# Patient Record
Sex: FEMALE | Race: WHITE | NOT HISPANIC OR LATINO
[De-identification: names, ages, dates, MRNs, and addresses within clinical notes are randomized per-mention and may not be internally consistent; named-entity substitution may affect disease eponyms.]

---

## 2021-10-15 ENCOUNTER — TRANSCRIPTION ENCOUNTER (OUTPATIENT)
Age: 66
End: 2021-10-15

## 2022-11-10 PROBLEM — Z00.00 ENCOUNTER FOR PREVENTIVE HEALTH EXAMINATION: Status: ACTIVE | Noted: 2022-11-10

## 2022-12-13 ENCOUNTER — APPOINTMENT (OUTPATIENT)
Dept: PEDIATRIC ALLERGY IMMUNOLOGY | Facility: CLINIC | Age: 67
End: 2022-12-13

## 2022-12-13 DIAGNOSIS — Z91.013 ALLERGY TO SEAFOOD: ICD-10-CM

## 2022-12-13 DIAGNOSIS — R04.0 EPISTAXIS: ICD-10-CM

## 2022-12-13 DIAGNOSIS — T50.905A ADVERSE EFFECT OF UNSPECIFIED DRUGS, MEDICAMENTS AND BIOLOGICAL SUBSTANCES, INITIAL ENCOUNTER: ICD-10-CM

## 2022-12-13 DIAGNOSIS — L23.0 ALLERGIC CONTACT DERMATITIS DUE TO METALS: ICD-10-CM

## 2022-12-13 DIAGNOSIS — R23.2 FLUSHING: ICD-10-CM

## 2022-12-13 PROCEDURE — 99204 OFFICE O/P NEW MOD 45 MIN: CPT | Mod: 95

## 2022-12-13 RX ORDER — PANTOPRAZOLE 40 MG/1
40 TABLET, DELAYED RELEASE ORAL
Qty: 14 | Refills: 0 | Status: COMPLETED | COMMUNITY
Start: 2022-09-28

## 2022-12-13 RX ORDER — BLOOD SUGAR DIAGNOSTIC
STRIP MISCELLANEOUS
Qty: 50 | Refills: 0 | Status: COMPLETED | COMMUNITY
Start: 2022-09-28

## 2022-12-13 RX ORDER — ENALAPRIL MALEATE 5 MG/1
5 TABLET ORAL
Qty: 30 | Refills: 0 | Status: COMPLETED | COMMUNITY
Start: 2022-09-28

## 2022-12-13 RX ORDER — LOSARTAN POTASSIUM 50 MG/1
50 TABLET, FILM COATED ORAL
Qty: 180 | Refills: 0 | Status: ACTIVE | COMMUNITY
Start: 2022-11-22

## 2022-12-13 RX ORDER — MECLIZINE HYDROCHLORIDE 12.5 MG/1
12.5 TABLET ORAL
Qty: 50 | Refills: 0 | Status: COMPLETED | COMMUNITY
Start: 2022-07-06

## 2022-12-13 RX ORDER — LOSARTAN POTASSIUM 25 MG/1
25 TABLET, FILM COATED ORAL
Qty: 270 | Refills: 0 | Status: COMPLETED | COMMUNITY
Start: 2022-09-07

## 2022-12-13 RX ORDER — ASPIRIN 81 MG/1
81 TABLET, COATED ORAL
Qty: 90 | Refills: 0 | Status: ACTIVE | COMMUNITY
Start: 2022-12-05

## 2022-12-13 RX ORDER — METFORMIN HYDROCHLORIDE 500 MG/1
500 TABLET, COATED ORAL
Qty: 60 | Refills: 0 | Status: COMPLETED | COMMUNITY
Start: 2022-09-28

## 2022-12-13 RX ORDER — EPINEPHRINE 0.3 MG/.3ML
0.3 INJECTION INTRAMUSCULAR
Refills: 0 | Status: ACTIVE | COMMUNITY

## 2022-12-13 RX ORDER — METOPROLOL SUCCINATE 100 MG/1
100 TABLET, EXTENDED RELEASE ORAL
Qty: 90 | Refills: 0 | Status: COMPLETED | COMMUNITY
Start: 2022-10-24

## 2022-12-13 RX ORDER — PRASUGREL 10 MG/1
10 TABLET, FILM COATED ORAL
Qty: 30 | Refills: 0 | Status: COMPLETED | COMMUNITY
Start: 2022-09-28

## 2022-12-13 RX ORDER — METOPROLOL SUCCINATE 50 MG/1
50 TABLET, EXTENDED RELEASE ORAL
Qty: 90 | Refills: 0 | Status: COMPLETED | COMMUNITY
Start: 2022-11-22

## 2022-12-13 RX ORDER — CARVEDILOL 3.12 MG/1
3.12 TABLET, FILM COATED ORAL
Qty: 60 | Refills: 0 | Status: ACTIVE | COMMUNITY
Start: 2022-12-06

## 2022-12-13 RX ORDER — ISOPROPYL ALCOHOL 70 ML/100ML
70 SWAB TOPICAL
Qty: 100 | Refills: 0 | Status: COMPLETED | COMMUNITY
Start: 2022-09-28

## 2022-12-17 NOTE — REVIEW OF SYSTEMS
[Fever] : no fever [Urticaria] : no urticaria [Swelling] : no swelling [Nl] : Respiratory [FreeTextEntry5] : see HPI

## 2022-12-17 NOTE — PHYSICAL EXAM
[Alert] : alert [No Acute Distress] : no acute distress [Normal Voice/Communication] : normal voice communication [Sclera Not Icteric] : sclera not icteric [Normal Rate and Effort] : normal respiratory rhythm and effort [Alert, Awake, Oriented as Age-Appropriate] : alert, awake, oriented as age appropriate

## 2022-12-17 NOTE — CONSULT LETTER
[Dear  ___] : Dear  [unfilled], [Consult Letter:] : I had the pleasure of evaluating your patient, [unfilled]. [Please see my note below.] : Please see my note below. [Consult Closing:] : Thank you very much for allowing me to participate in the care of this patient.  If you have any questions, please do not hesitate to contact me. [Sincerely,] : Sincerely, [DrOzzie  ___] : Dr. IRAHETA [DrOzzie ___] : Dr. IRAHETA [FreeTextEntry3] : Mdadie Barrera MD FAAPAUL, BENJAMIN\par Adult and Pediatric Allergy, Asthma and Clinical Immunology\par  of Medicine and Pediatrics at\par   [facility]\par Section Head, Adult Allergy and Immunology\par   [facility]\par   Division of Allergy, Asthma and Immunology\par   [address] [place] [address], [place] [address]\par   [place] [address]\par   Phone [phone]  Fax [phone]\par \par

## 2022-12-17 NOTE — HISTORY OF PRESENT ILLNESS
[Home] : at home, [unfilled] , at the time of the visit. [Medical Office: ([facility])___] : at the medical office located in  [Verbal consent obtained from patient] : the patient, [unfilled] [Asthma] : asthma [Venom Reactions] : venom reactions [de-identified] : \par AAMIR DUDLEY  is a 67 year old  female with CAD, s/p STEMI 22, DM [facility] address her medication allergies .\par \par She had MI 2022 the day of her mother's . She has been getting better.\par She had 2 angiograms: - 4 stents were put in; 2nd one- 10/11/22 because of abnormal EKG that showed patent RCA stents.. She was premedicated with steroids and Diphenhydramine/Benadryl before both. \par \par She has been having burning tongue (persistent), red flushed hot face (not itchy, on and off all day, feels like a different type of hot flush than menopause), burning in the vaginal area, pain in her armpit and scapula and leg cramps. \par \par She was discharged from the hospital on: Enalapril, Brelinta, Metoprolol, ASA\par Because of burning and blood nose her medications were switched to:\par - Carvedilol (1 week ago) and Losartan (she has been taking for years, took a little break during hospitalization, when she was taking enalapril).\par \par Before MI she has been taking Losartan for HTN for years.\par \par - She was taking baby ASA and Plavix, but stopped it about a week ago because of bloody nose. \par \par About 10 years ago Dr Yadira Jules tested her and told her that she is allergic to:\par Xanthan gum - throat closing, no rash diagnosed by Dr Jules via skin testing, had to go to ER from allergist's office \par Guar gum - throat closing\par IV contrast - developed convulsions as a child. Received contrast with premeds since. She tolerates Gadolinium. \par Sulfa- almost immediately welts and skin on her fingers peel as a young adult\par \par She carries dye-free Diphenhydramine/Benadryl, never had to use  Epinephrine Autoinjector \par She is OK with cellulose gum\par \par She denies history of random hives. Her sister has intermittent hives. \par She does admit dry skin. \par She does get burning, hot ears with costume jewelry. \par \par food allergies:\par - shellfish- hives, vomiting. She tolerates fish. \par - sulfa containing foods- give her gas\par \par She is an acupuncturist.

## 2022-12-22 ENCOUNTER — LABORATORY RESULT (OUTPATIENT)
Age: 67
End: 2022-12-22

## 2022-12-22 LAB
BASOPHILS # BLD AUTO: 0.01 K/UL
BASOPHILS NFR BLD AUTO: 0.1 %
EOSINOPHIL # BLD AUTO: 0.14 K/UL
EOSINOPHIL NFR BLD AUTO: 1.6 %
HCT VFR BLD CALC: 42.9 %
HGB BLD-MCNC: 14.2 G/DL
IMM GRANULOCYTES NFR BLD AUTO: 0.2 %
LYMPHOCYTES # BLD AUTO: 2.66 K/UL
LYMPHOCYTES NFR BLD AUTO: 29.8 %
MAN DIFF?: NORMAL
MCHC RBC-ENTMCNC: 30.7 PG
MCHC RBC-ENTMCNC: 33.1 GM/DL
MCV RBC AUTO: 92.9 FL
MONOCYTES # BLD AUTO: 0.44 K/UL
MONOCYTES NFR BLD AUTO: 4.9 %
NEUTROPHILS # BLD AUTO: 5.67 K/UL
NEUTROPHILS NFR BLD AUTO: 63.4 %
PLATELET # BLD AUTO: 184 K/UL
RBC # BLD: 4.62 M/UL
RBC # FLD: 13 %
WBC # FLD AUTO: 8.94 K/UL

## 2022-12-29 LAB
BLUE MUSSEL IGE QN: <0.1 KUA/L
CLAM IGE QN: <0.1 KUA/L
CRAB IGE QN: <0.1 KUA/L
DEPRECATED BLUE MUSSEL IGE RAST QL: 0
DEPRECATED CLAM IGE RAST QL: 0
DEPRECATED CRAB IGE RAST QL: 0
DEPRECATED LOBSTER IGE RAST QL: 0
DEPRECATED OYSTER IGE RAST QL: 0
DEPRECATED SCALLOP IGE RAST QL: <0.1 KUA/L
DEPRECATED SHRIMP IGE RAST QL: 0
LOBSTER IGE QN: <0.1 KUA/L
OYSTER IGE QN: <0.1 KUA/L
SCALLOP IGE QN: 0
SCALLOP IGE QN: <0.1 KUA/L
TRYPTASE: 6.4 UG/L

## 2023-01-19 ENCOUNTER — APPOINTMENT (OUTPATIENT)
Dept: PEDIATRIC ALLERGY IMMUNOLOGY | Facility: CLINIC | Age: 68
End: 2023-01-19

## 2025-08-14 ENCOUNTER — NON-APPOINTMENT (OUTPATIENT)
Age: 70
End: 2025-08-14